# Patient Record
Sex: FEMALE | Race: BLACK OR AFRICAN AMERICAN | NOT HISPANIC OR LATINO | URBAN - METROPOLITAN AREA
[De-identification: names, ages, dates, MRNs, and addresses within clinical notes are randomized per-mention and may not be internally consistent; named-entity substitution may affect disease eponyms.]

---

## 2022-10-04 ENCOUNTER — OFFICE VISIT (OUTPATIENT)
Dept: URBAN - METROPOLITAN AREA CLINIC 17 | Facility: CLINIC | Age: 22
End: 2022-10-04

## 2022-12-14 ENCOUNTER — DASHBOARD ENCOUNTERS (OUTPATIENT)
Age: 22
End: 2022-12-14

## 2022-12-15 ENCOUNTER — OFFICE VISIT (OUTPATIENT)
Dept: URBAN - METROPOLITAN AREA CLINIC 92 | Facility: CLINIC | Age: 22
End: 2022-12-15

## 2022-12-15 NOTE — HPI-TODAY'S VISIT:
The is a 22-year-old female referred for GI consultation and a copy will be sent to the referring provider.  Patient had previously been under the care of Dr. Lux and saw him in 2019.  Pt had an EGD and this showed gastritis and some erosions and duodenitis with H. pylori.  She was treated with triple antibiotics for the H. pylori.  EGD did show chronic active duodenitis but no evidence of celiac.  Esophageal biopsy showed reflux changes.When he first saw her prior to the EGD, she had complained of shortness of breath, weakness and tachycardia and went to the ER was told she had asthma.  She had developed some dysphagia.  Dr. Lux also empirically gave her Diflucan although the reason for this is not clear. 2.15